# Patient Record
Sex: FEMALE | Race: ASIAN | NOT HISPANIC OR LATINO | URBAN - METROPOLITAN AREA
[De-identification: names, ages, dates, MRNs, and addresses within clinical notes are randomized per-mention and may not be internally consistent; named-entity substitution may affect disease eponyms.]

---

## 2017-12-18 ENCOUNTER — EMERGENCY (EMERGENCY)
Facility: HOSPITAL | Age: 22
LOS: 1 days | Discharge: HOME | End: 2017-12-18
Attending: EMERGENCY MEDICINE | Admitting: EMERGENCY MEDICINE
Payer: COMMERCIAL

## 2017-12-18 VITALS
OXYGEN SATURATION: 100 % | DIASTOLIC BLOOD PRESSURE: 59 MMHG | RESPIRATION RATE: 20 BRPM | HEART RATE: 113 BPM | SYSTOLIC BLOOD PRESSURE: 92 MMHG | TEMPERATURE: 98 F

## 2017-12-18 PROCEDURE — 99285 EMERGENCY DEPT VISIT HI MDM: CPT

## 2017-12-18 RX ORDER — SODIUM CHLORIDE 9 MG/ML
1000 INJECTION INTRAMUSCULAR; INTRAVENOUS; SUBCUTANEOUS ONCE
Qty: 0 | Refills: 0 | Status: COMPLETED | OUTPATIENT
Start: 2017-12-18 | End: 2017-12-18

## 2017-12-18 RX ORDER — ONDANSETRON 8 MG/1
4 TABLET, FILM COATED ORAL ONCE
Qty: 0 | Refills: 0 | Status: COMPLETED | OUTPATIENT
Start: 2017-12-18 | End: 2017-12-18

## 2017-12-18 RX ORDER — FAMOTIDINE 10 MG/ML
20 INJECTION INTRAVENOUS ONCE
Qty: 0 | Refills: 0 | Status: COMPLETED | OUTPATIENT
Start: 2017-12-18 | End: 2017-12-18

## 2017-12-18 RX ADMIN — ONDANSETRON 4 MILLIGRAM(S): 8 TABLET, FILM COATED ORAL at 23:58

## 2017-12-18 RX ADMIN — FAMOTIDINE 20 MILLIGRAM(S): 10 INJECTION INTRAVENOUS at 23:58

## 2017-12-18 RX ADMIN — SODIUM CHLORIDE 1000 MILLILITER(S): 9 INJECTION INTRAMUSCULAR; INTRAVENOUS; SUBCUTANEOUS at 23:58

## 2017-12-18 NOTE — ED PROVIDER NOTE - MUSCULOSKELETAL, MLM
Spine appears normal, range of motion is not limited, no muscle or joint tenderness. No CVA tenderness

## 2017-12-18 NOTE — ED PROVIDER NOTE - PROGRESS NOTE DETAILS
Joanne: labs and US wnl. Patient reports improvement in symptoms and tolerating PO. Patient isntructed to return to ED if persistent vomiting/fever or pain that localizes to RLQ

## 2017-12-18 NOTE — ED PROVIDER NOTE - MEDICAL DECISION MAKING DETAILS
22 F with diffuse abd pain worse in epigastric area. VS with tachycardia, afebrile. Exam with epigastric tenderness, negative ethan. With vomiting and diarrhea and afebrile likely gastroenteritis. Will check basic labs with lipase, hydration, symptom management, and CXR/ekg.

## 2017-12-18 NOTE — ED ADULT NURSE NOTE - OBJECTIVE STATEMENT
22 year old female alert and oriented x 4 came to the ED c/o abdominal pain radiating to the back that began about 4 hours prior to arrival.  Patient said pain began suddenly and is in the epigastric region.  Patient said pain began to radiate to the chest as well.  Patient denies; shortness of breath, fevers, pain or burning with urination.   Patient has nausea.  Patient has equal and symmetrical chest rise with unlabored breathing.  Patient placed on CM in SR and EKG done at bedside.  Safety ensured.

## 2017-12-18 NOTE — ED PROVIDER NOTE - OBJECTIVE STATEMENT
22 F no pmh, p/w abdominal pain radiating to back. Patient sys she had sudden onset epigastric pain that started about 4 hours ago, with 3 episodes NBNB emesis. Now c/o pain radiating to back and chest. +chills. No fever. Felt lightheaded but no syncope. No dysuria/frequency. 22 F no pmh, p/w abdominal pain radiating to back. Patient sys she had sudden onset epigastric pain that started about 4 hours ago, with 3 episodes NBNB emesis. Now c/o pain radiating to back and chest. +chills. No fever. Felt lightheaded but no syncope. No dysuria/frequency. LMP 2 weeks ago. No pleuritic pain. No recent travel. No SOB/cough.

## 2017-12-18 NOTE — ED PROVIDER NOTE - ATTENDING CONTRIBUTION TO CARE
Remedios Viera MD  22 F with diffuse abd pain worse in epigastric area, radiating to the back, vomiting NBNB; on exam VS with tachycardia, afebrile. Exam with epigastric tenderness, negative Spence. With vomiting and diarrhea and afebrile likely gastroenteritis. Will check basic labs with lipase, hydration, symptom management, and CXR/ekg.

## 2017-12-18 NOTE — ED PROVIDER NOTE - CONSTITUTIONAL, MLM
normal... Tearful, well nourished, awake, alert, oriented to person, place, time/situation and in no apparent distress.

## 2017-12-19 VITALS — HEART RATE: 110 BPM | TEMPERATURE: 99 F

## 2017-12-19 LAB
ALBUMIN SERPL ELPH-MCNC: 4.2 G/DL — SIGNIFICANT CHANGE UP (ref 3.3–5)
ALP SERPL-CCNC: 58 U/L — SIGNIFICANT CHANGE UP (ref 40–120)
ALT FLD-CCNC: 11 U/L RC — SIGNIFICANT CHANGE UP (ref 10–45)
ANION GAP SERPL CALC-SCNC: 16 MMOL/L — SIGNIFICANT CHANGE UP (ref 5–17)
AST SERPL-CCNC: 15 U/L — SIGNIFICANT CHANGE UP (ref 10–40)
BILIRUB SERPL-MCNC: 0.4 MG/DL — SIGNIFICANT CHANGE UP (ref 0.2–1.2)
BUN SERPL-MCNC: 19 MG/DL — SIGNIFICANT CHANGE UP (ref 7–23)
CALCIUM SERPL-MCNC: 9.3 MG/DL — SIGNIFICANT CHANGE UP (ref 8.4–10.5)
CHLORIDE SERPL-SCNC: 101 MMOL/L — SIGNIFICANT CHANGE UP (ref 96–108)
CO2 SERPL-SCNC: 23 MMOL/L — SIGNIFICANT CHANGE UP (ref 22–31)
CREAT SERPL-MCNC: 0.93 MG/DL — SIGNIFICANT CHANGE UP (ref 0.5–1.3)
GAS PNL BLDV: SIGNIFICANT CHANGE UP
GLUCOSE SERPL-MCNC: 125 MG/DL — HIGH (ref 70–99)
HCT VFR BLD CALC: 40.9 % — SIGNIFICANT CHANGE UP (ref 34.5–45)
HGB BLD-MCNC: 14.1 G/DL — SIGNIFICANT CHANGE UP (ref 11.5–15.5)
LIDOCAIN IGE QN: 20 U/L — SIGNIFICANT CHANGE UP (ref 7–60)
MCHC RBC-ENTMCNC: 30.4 PG — SIGNIFICANT CHANGE UP (ref 27–34)
MCHC RBC-ENTMCNC: 34.5 GM/DL — SIGNIFICANT CHANGE UP (ref 32–36)
MCV RBC AUTO: 88.1 FL — SIGNIFICANT CHANGE UP (ref 80–100)
PLATELET # BLD AUTO: 204 K/UL — SIGNIFICANT CHANGE UP (ref 150–400)
POTASSIUM SERPL-MCNC: 4.1 MMOL/L — SIGNIFICANT CHANGE UP (ref 3.5–5.3)
POTASSIUM SERPL-SCNC: 4.1 MMOL/L — SIGNIFICANT CHANGE UP (ref 3.5–5.3)
PROT SERPL-MCNC: 8.3 G/DL — SIGNIFICANT CHANGE UP (ref 6–8.3)
RBC # BLD: 4.64 M/UL — SIGNIFICANT CHANGE UP (ref 3.8–5.2)
RBC # FLD: 11.4 % — SIGNIFICANT CHANGE UP (ref 10.3–14.5)
SODIUM SERPL-SCNC: 140 MMOL/L — SIGNIFICANT CHANGE UP (ref 135–145)
WBC # BLD: 12.3 K/UL — HIGH (ref 3.8–10.5)
WBC # FLD AUTO: 12.3 K/UL — HIGH (ref 3.8–10.5)

## 2017-12-19 PROCEDURE — 99284 EMERGENCY DEPT VISIT MOD MDM: CPT | Mod: 25

## 2017-12-19 PROCEDURE — 71046 X-RAY EXAM CHEST 2 VIEWS: CPT

## 2017-12-19 PROCEDURE — 84132 ASSAY OF SERUM POTASSIUM: CPT

## 2017-12-19 PROCEDURE — 82330 ASSAY OF CALCIUM: CPT

## 2017-12-19 PROCEDURE — 96375 TX/PRO/DX INJ NEW DRUG ADDON: CPT

## 2017-12-19 PROCEDURE — 85027 COMPLETE CBC AUTOMATED: CPT

## 2017-12-19 PROCEDURE — 82803 BLOOD GASES ANY COMBINATION: CPT

## 2017-12-19 PROCEDURE — 96374 THER/PROPH/DIAG INJ IV PUSH: CPT

## 2017-12-19 PROCEDURE — 82435 ASSAY OF BLOOD CHLORIDE: CPT

## 2017-12-19 PROCEDURE — 80053 COMPREHEN METABOLIC PANEL: CPT

## 2017-12-19 PROCEDURE — 71020: CPT | Mod: 26

## 2017-12-19 PROCEDURE — 83690 ASSAY OF LIPASE: CPT

## 2017-12-19 PROCEDURE — 83605 ASSAY OF LACTIC ACID: CPT

## 2017-12-19 PROCEDURE — 82947 ASSAY GLUCOSE BLOOD QUANT: CPT

## 2017-12-19 PROCEDURE — 85014 HEMATOCRIT: CPT

## 2017-12-19 PROCEDURE — 76705 ECHO EXAM OF ABDOMEN: CPT | Mod: 26,RT

## 2017-12-19 PROCEDURE — 76705 ECHO EXAM OF ABDOMEN: CPT

## 2017-12-19 PROCEDURE — 84295 ASSAY OF SERUM SODIUM: CPT

## 2017-12-19 RX ORDER — ACETAMINOPHEN 500 MG
1000 TABLET ORAL ONCE
Qty: 0 | Refills: 0 | Status: COMPLETED | OUTPATIENT
Start: 2017-12-19 | End: 2017-12-19

## 2017-12-19 RX ORDER — ONDANSETRON 8 MG/1
1 TABLET, FILM COATED ORAL
Qty: 6 | Refills: 0 | OUTPATIENT
Start: 2017-12-19 | End: 2017-12-20

## 2017-12-19 RX ADMIN — Medication 400 MILLIGRAM(S): at 02:30

## 2017-12-19 RX ADMIN — Medication 30 MILLILITER(S): at 00:21

## 2018-08-09 ENCOUNTER — EMERGENCY (EMERGENCY)
Facility: HOSPITAL | Age: 23
LOS: 1 days | Discharge: HOME | End: 2018-08-09
Attending: EMERGENCY MEDICINE
Payer: COMMERCIAL

## 2018-08-09 VITALS
TEMPERATURE: 98 F | SYSTOLIC BLOOD PRESSURE: 114 MMHG | RESPIRATION RATE: 16 BRPM | WEIGHT: 130.07 LBS | OXYGEN SATURATION: 97 % | DIASTOLIC BLOOD PRESSURE: 77 MMHG | HEIGHT: 62 IN | HEART RATE: 83 BPM

## 2018-08-09 VITALS
TEMPERATURE: 98 F | DIASTOLIC BLOOD PRESSURE: 65 MMHG | HEART RATE: 68 BPM | RESPIRATION RATE: 18 BRPM | SYSTOLIC BLOOD PRESSURE: 101 MMHG | OXYGEN SATURATION: 100 %

## 2018-08-09 PROCEDURE — 93005 ELECTROCARDIOGRAM TRACING: CPT

## 2018-08-09 PROCEDURE — 93010 ELECTROCARDIOGRAM REPORT: CPT | Mod: NC

## 2018-08-09 PROCEDURE — 82962 GLUCOSE BLOOD TEST: CPT

## 2018-08-09 PROCEDURE — 99284 EMERGENCY DEPT VISIT MOD MDM: CPT | Mod: 25

## 2018-08-09 PROCEDURE — 96360 HYDRATION IV INFUSION INIT: CPT

## 2018-08-09 PROCEDURE — 99283 EMERGENCY DEPT VISIT LOW MDM: CPT | Mod: 25

## 2018-08-09 RX ORDER — ACETAMINOPHEN 500 MG
975 TABLET ORAL ONCE
Qty: 0 | Refills: 0 | Status: COMPLETED | OUTPATIENT
Start: 2018-08-09 | End: 2018-08-09

## 2018-08-09 RX ORDER — SODIUM CHLORIDE 9 MG/ML
1000 INJECTION INTRAMUSCULAR; INTRAVENOUS; SUBCUTANEOUS ONCE
Qty: 0 | Refills: 0 | Status: COMPLETED | OUTPATIENT
Start: 2018-08-09 | End: 2018-08-09

## 2018-08-09 RX ADMIN — SODIUM CHLORIDE 1000 MILLILITER(S): 9 INJECTION INTRAMUSCULAR; INTRAVENOUS; SUBCUTANEOUS at 13:08

## 2018-08-09 RX ADMIN — Medication 975 MILLIGRAM(S): at 14:01

## 2018-08-09 RX ADMIN — Medication 975 MILLIGRAM(S): at 13:09

## 2018-08-09 RX ADMIN — SODIUM CHLORIDE 1000 MILLILITER(S): 9 INJECTION INTRAMUSCULAR; INTRAVENOUS; SUBCUTANEOUS at 14:01

## 2018-08-09 NOTE — ED PROVIDER NOTE - MEDICAL DECISION MAKING DETAILS
MD Esme,Attending: pt seen. agree with above HPI/ROS/PE. no high risk features such as fever/neck sxs/neuro findings. NO CNS imaging indicated at this time. For EKG to r/o dysrhythmia/FSBG r/o hypoglycemia; HEG r/o pregnancy. If normal: DC for OP eval and return for progressive sxs.

## 2018-08-09 NOTE — ED PROVIDER NOTE - PROGRESS NOTE DETAILS
Patient feeling better after IVF and tylenol. Instructed to follow up with her pmd. Return precautions for worsening headache, neck stiffness, fever.

## 2018-08-09 NOTE — ED ADULT NURSE NOTE - NSIMPLEMENTINTERV_GEN_ALL_ED
Implemented All Universal Safety Interventions:  [place] to call system. Call bell, personal items and telephone within reach. Instruct patient to call for assistance. Room bathroom lighting operational. Non-slip footwear when patient is off stretcher. Physically safe environment: no spills, clutter or unnecessary equipment. Stretcher in lowest position, wheels locked, appropriate side rails in place.

## 2018-08-09 NOTE — ED ADULT NURSE NOTE - OBJECTIVE STATEMENT
23 y.o F A&Ox3 with no pertinent PMH presents to the ED c/o HA and dizziness since Friday. Pt. reports she developed a HA to the back of her head on Friday and felt dizzy so she visited the wellness center. Pt. reports her BP was low - 80s/60s and was sent home. Pt. states she had "Chest pain" Friday which resolved spontaneously. Pt. reports symptoms continued throughout night and took an Advil for her HA. Reports she began having chills and took her temperature and it was 102.5. Pt. reports fever resolved spontaneously and has not had a temperature since. Pt. also reports she has been felling SOB during activity while walking up stairs and has not experienced this before. States she went back to the wellness center yesterday again for same symptoms. Pt. states "ears feel clogged with HA." Denies ABD pain. Last BM was yesterday and states "it was hard green lyric." Pt. reports she has not been staying hydrated, drinks 2 water bottles a day. Pt. denies taking any medications today. Reports she currently has a HA and feels dizzy while lying down. PERRL- 2 mm bilaterally. Pos. strength and sensory perception to all extremities. Denies numbness/tingling. LMP this Monday. Safety and comfort provided. Family at bedside.

## 2018-08-09 NOTE — ED ADULT TRIAGE NOTE - CHIEF COMPLAINT QUOTE
Evaluated [facility] x 1 week ago at job s/p episode of dizziness and diaphoresis, had BP 80/60 of and 103F on Friday. Pt endorsing continued symptoms of dizziness and headache.

## 2018-08-09 NOTE — ED PROVIDER NOTE - OBJECTIVE STATEMENT
23 F no significant pmh, p/w dizziness and headache. Headache is in occipital area, started gradually 5 days ago, intermittent, not related to exertion or position. Had fever 5 days ago, but has been afebrile since then. No neck pain or stiffness. Taking motrin for headache with slight relief. No n/v. No vision change or numbness/weakness. LMP 3 days ago.

## 2019-05-25 ENCOUNTER — EMERGENCY (EMERGENCY)
Facility: HOSPITAL | Age: 24
LOS: 1 days | Discharge: HOME | End: 2019-05-25
Attending: EMERGENCY MEDICINE
Payer: COMMERCIAL

## 2019-05-25 VITALS
RESPIRATION RATE: 20 BRPM | WEIGHT: 134.92 LBS | TEMPERATURE: 98 F | DIASTOLIC BLOOD PRESSURE: 77 MMHG | SYSTOLIC BLOOD PRESSURE: 110 MMHG | HEART RATE: 88 BPM | HEIGHT: 63 IN | OXYGEN SATURATION: 99 %

## 2019-05-25 LAB
ALBUMIN SERPL ELPH-MCNC: 4.1 G/DL — SIGNIFICANT CHANGE UP (ref 3.3–5)
ALP SERPL-CCNC: 65 U/L — SIGNIFICANT CHANGE UP (ref 40–120)
ALT FLD-CCNC: 12 U/L — SIGNIFICANT CHANGE UP (ref 10–45)
ANION GAP SERPL CALC-SCNC: 11 MMOL/L — SIGNIFICANT CHANGE UP (ref 5–17)
AST SERPL-CCNC: 17 U/L — SIGNIFICANT CHANGE UP (ref 10–40)
BASOPHILS # BLD AUTO: 0 K/UL — SIGNIFICANT CHANGE UP (ref 0–0.2)
BASOPHILS NFR BLD AUTO: 0.4 % — SIGNIFICANT CHANGE UP (ref 0–2)
BILIRUB SERPL-MCNC: 0.2 MG/DL — SIGNIFICANT CHANGE UP (ref 0.2–1.2)
BUN SERPL-MCNC: 13 MG/DL — SIGNIFICANT CHANGE UP (ref 7–23)
CALCIUM SERPL-MCNC: 9.5 MG/DL — SIGNIFICANT CHANGE UP (ref 8.4–10.5)
CHLORIDE SERPL-SCNC: 104 MMOL/L — SIGNIFICANT CHANGE UP (ref 96–108)
CO2 SERPL-SCNC: 24 MMOL/L — SIGNIFICANT CHANGE UP (ref 22–31)
CREAT SERPL-MCNC: 0.78 MG/DL — SIGNIFICANT CHANGE UP (ref 0.5–1.3)
EOSINOPHIL # BLD AUTO: 0.1 K/UL — SIGNIFICANT CHANGE UP (ref 0–0.5)
EOSINOPHIL NFR BLD AUTO: 1.5 % — SIGNIFICANT CHANGE UP (ref 0–6)
GLUCOSE SERPL-MCNC: 84 MG/DL — SIGNIFICANT CHANGE UP (ref 70–99)
HCT VFR BLD CALC: 39.6 % — SIGNIFICANT CHANGE UP (ref 34.5–45)
HGB BLD-MCNC: 13.5 G/DL — SIGNIFICANT CHANGE UP (ref 11.5–15.5)
LYMPHOCYTES # BLD AUTO: 3.1 K/UL — SIGNIFICANT CHANGE UP (ref 1–3.3)
LYMPHOCYTES # BLD AUTO: 31.4 % — SIGNIFICANT CHANGE UP (ref 13–44)
MCHC RBC-ENTMCNC: 29.8 PG — SIGNIFICANT CHANGE UP (ref 27–34)
MCHC RBC-ENTMCNC: 34 GM/DL — SIGNIFICANT CHANGE UP (ref 32–36)
MCV RBC AUTO: 87.4 FL — SIGNIFICANT CHANGE UP (ref 80–100)
MONOCYTES # BLD AUTO: 0.9 K/UL — SIGNIFICANT CHANGE UP (ref 0–0.9)
MONOCYTES NFR BLD AUTO: 8.9 % — SIGNIFICANT CHANGE UP (ref 2–14)
NEUTROPHILS # BLD AUTO: 5.8 K/UL — SIGNIFICANT CHANGE UP (ref 1.8–7.4)
NEUTROPHILS NFR BLD AUTO: 57.9 % — SIGNIFICANT CHANGE UP (ref 43–77)
PLATELET # BLD AUTO: 254 K/UL — SIGNIFICANT CHANGE UP (ref 150–400)
POTASSIUM SERPL-MCNC: 4 MMOL/L — SIGNIFICANT CHANGE UP (ref 3.5–5.3)
POTASSIUM SERPL-SCNC: 4 MMOL/L — SIGNIFICANT CHANGE UP (ref 3.5–5.3)
PROT SERPL-MCNC: 7.9 G/DL — SIGNIFICANT CHANGE UP (ref 6–8.3)
RBC # BLD: 4.53 M/UL — SIGNIFICANT CHANGE UP (ref 3.8–5.2)
RBC # FLD: 11.3 % — SIGNIFICANT CHANGE UP (ref 10.3–14.5)
SODIUM SERPL-SCNC: 139 MMOL/L — SIGNIFICANT CHANGE UP (ref 135–145)
WBC # BLD: 10 K/UL — SIGNIFICANT CHANGE UP (ref 3.8–10.5)
WBC # FLD AUTO: 10 K/UL — SIGNIFICANT CHANGE UP (ref 3.8–10.5)

## 2019-05-25 PROCEDURE — 70491 CT SOFT TISSUE NECK W/DYE: CPT | Mod: 26

## 2019-05-25 PROCEDURE — 99284 EMERGENCY DEPT VISIT MOD MDM: CPT

## 2019-05-25 NOTE — ED PROVIDER NOTE - CLINICAL SUMMARY MEDICAL DECISION MAKING FREE TEXT BOX
24 year old F with right sided neck pain and dysphagia s/p wisdom tooth extraction 2 weeks ago. Afebrile here in ED. Oropharynx with no obvious signs of infection. No significant swelling of neck, likely pain secondary to extraction but will evaluate for possible abscess. 24 year old F with right sided neck pain and dysphagia s/p wisdom tooth extraction 2 weeks ago. Afebrile here in ED. Oropharynx with no obvious signs of infection. No significant swelling of neck, likely lymphadenopathy. Will evaluate for possible deep space abscess due to recent procedure and hx of abscess. Plan: CT neck, labs

## 2019-05-25 NOTE — ED PROVIDER NOTE - OBJECTIVE STATEMENT
24 year old F with recent hx of wisdom tooth removal 2 weeks ago presents to ED c/o of constant right ear and right sided sharp neck pain and painful swallowing x1 week. +cold sweats. Pt began to feel neck pain on 5/20 and the following day developed an earache. Neck pain worsened when laying on side and notes some difficulty breathing when laying down. Decreased PO intake secondary to pain. States she "feels as if something stuck in throat" but does not feel bumps when palpating neck. Denies drainage or foul smell from site. Denies fever, rash, and  abd pain. Saw her dentist who advised to take ibuprofen, but Sx continued. Has been taking Naproxen with no relief. Hx of throat abscess, no surgical removal, given steroids.

## 2019-05-25 NOTE — ED PROVIDER NOTE - NSFOLLOWUPINSTRUCTIONS_ED_ALL_ED_FT
- Follow up with ENT as provided  - Take Augmentin as prescribed  - Take Naproxen and tylenol for pain  - Return to ED for new or worsening symptoms

## 2019-05-25 NOTE — ED PROVIDER NOTE - PROGRESS NOTE DETAILS
Attending Grabiel Jimenez DO: CT with tonsilitis without abscess. Pt in no resp distress, no stridor, no hoarse or muffled voice. Will treat with decadron and abx and dc with ENT follow up.

## 2019-05-25 NOTE — ED PROVIDER NOTE - ATTENDING CONTRIBUTION TO CARE
I performed a history and physical exam of the patient and discussed their management with the resident. I reviewed the resident's note and agree with the documented findings and plan of care, except if noted below. My medical decision making and observations can be found be found below.    25 yo female presents with right sided anterior neck pain and swelling for past 2 weeks since wisdom tooth extraction. States painful when swallowing. No fevers. On exam, well appearing, no difficulty breathing, no stridor, +tender right sided anterior cervical lymphadenopathy, uvula midline, no peritonsillar swelling. Likely pain from recent dental work with reactive lymphadenopathy as cause of neck pain, however given recent procedure will obtain ct scan to eval for deep space collection/infection.

## 2019-05-25 NOTE — ED ADULT NURSE NOTE - OBJECTIVE STATEMENT
pt had the right wisdom tooth removed 2 weeks ago  she developed pain on the right front of her neck involving her right ear this past week.  she is here for continued pain that the DDS refers to as referred pain

## 2019-05-25 NOTE — ED PROVIDER NOTE - NSFOLLOWUPCLINICS_GEN_ALL_ED_FT
[facility]  ENT  [address]  [place] [address]  Phone: [phone]  Fax:   Follow Up Time:

## 2019-05-25 NOTE — ED ADULT TRIAGE NOTE - CHIEF COMPLAINT QUOTE
x 2wks pulled wisdom tooth and 1wk w/ ear neck and diff swallowing per pt   seen by dentist called referred pain

## 2019-05-25 NOTE — ED PROVIDER NOTE - TOOTH FINDINGS
no obvious abscess or unilateral swelling noted. There is an empty socket in wisdom teeth on right side with no purulent drainage noted. No tenderness to palpitation with teeth, but there is mild tenderness in empty socket.

## 2019-05-26 VITALS — WEIGHT: 39.46 LBS

## 2019-05-26 PROCEDURE — 80053 COMPREHEN METABOLIC PANEL: CPT

## 2019-05-26 PROCEDURE — 85027 COMPLETE CBC AUTOMATED: CPT

## 2019-05-26 PROCEDURE — 99284 EMERGENCY DEPT VISIT MOD MDM: CPT | Mod: 25

## 2019-05-26 PROCEDURE — 70491 CT SOFT TISSUE NECK W/DYE: CPT

## 2019-05-26 RX ORDER — DEXAMETHASONE 0.5 MG/5ML
10 ELIXIR ORAL ONCE
Refills: 0 | Status: COMPLETED | OUTPATIENT
Start: 2019-05-26 | End: 2019-05-26

## 2019-05-26 RX ORDER — ACETAMINOPHEN 500 MG
650 TABLET ORAL ONCE
Refills: 0 | Status: COMPLETED | OUTPATIENT
Start: 2019-05-26 | End: 2019-05-26

## 2019-05-26 RX ADMIN — Medication 10 MILLIGRAM(S): at 00:43

## 2019-05-26 RX ADMIN — Medication 650 MILLIGRAM(S): at 00:50

## 2019-05-26 RX ADMIN — Medication 1 TABLET(S): at 00:43

## 2019-05-28 PROBLEM — Z00.00 ENCOUNTER FOR PREVENTIVE HEALTH EXAMINATION: Status: ACTIVE | Noted: 2019-05-28

## 2019-06-03 ENCOUNTER — APPOINTMENT (OUTPATIENT)
Dept: OTOLARYNGOLOGY | Facility: CLINIC | Age: 24
End: 2019-06-03
Payer: COMMERCIAL

## 2019-06-03 VITALS
HEART RATE: 72 BPM | HEIGHT: 63 IN | DIASTOLIC BLOOD PRESSURE: 62 MMHG | TEMPERATURE: 98.4 F | BODY MASS INDEX: 23.92 KG/M2 | OXYGEN SATURATION: 99 % | SYSTOLIC BLOOD PRESSURE: 102 MMHG | WEIGHT: 135 LBS

## 2019-06-03 DIAGNOSIS — R59.0 LOCALIZED ENLARGED LYMPH NODES: ICD-10-CM

## 2019-06-03 DIAGNOSIS — Z78.9 OTHER SPECIFIED HEALTH STATUS: ICD-10-CM

## 2019-06-03 DIAGNOSIS — Z82.49 FAMILY HISTORY OF ISCHEMIC HEART DISEASE AND OTHER DISEASES OF THE CIRCULATORY SYSTEM: ICD-10-CM

## 2019-06-03 DIAGNOSIS — H92.01 OTALGIA, RIGHT EAR: ICD-10-CM

## 2019-06-03 DIAGNOSIS — Z83.3 FAMILY HISTORY OF DIABETES MELLITUS: ICD-10-CM

## 2019-06-03 PROCEDURE — 99243 OFF/OP CNSLTJ NEW/EST LOW 30: CPT

## 2019-06-03 RX ORDER — AMOXICILLIN AND CLAVULANATE POTASSIUM 875; 125 MG/1; 1/1
875-125 TABLET, FILM COATED ORAL
Refills: 0 | Status: ACTIVE | COMMUNITY

## 2019-06-03 RX ORDER — NORETHINDRONE ACETATE AND ETHINYL ESTRADIOL AND FERROUS FUMARATE 1MG-20(21)
1-20 KIT ORAL
Refills: 0 | Status: ACTIVE | COMMUNITY

## 2019-06-03 NOTE — DATA REVIEWED
[de-identified] : CT neck - Images reviewed.  No PTA or tonsillar swelling seen.  No neck abscess.

## 2019-06-03 NOTE — REVIEW OF SYSTEMS
[Ear Pain] : ear pain [Ear Itch] : ear itch [Throat Clearing] : throat clearing [Throat Itching] : throat itching [Throat Dryness] : throat dryness [Throat Pain] : throat pain [Swelling Face] : face swelling [Swelling Neck] : swelling neck [Swollen Glands] : swollen glands [Negative] : Endocrine [Hearing Loss] : no hearing loss [Dizziness] : no dizziness [Vertigo] : no vertigo [Recurrent Ear Infections] : no recurrent ear infections [Lightheadedness] : no lightheadedness [Ear Noises] : no ear noises [Ear Drainage] : no ear drainage [Hoarseness] : no hoarseness [Easy Bleeding] : no tendency for easy bleeding [Easy Bruising] : no tendency for easy bruising [Swollen Glands In The Neck] : no swollen glands in the neck [FreeTextEntry7] : Difficulty swallowing [de-identified] : Headache [de-identified] : Feel cooler than others

## 2019-06-03 NOTE — CONSULT LETTER
[Dear  ___] : Dear  [unfilled], [Please see my note below.] : Please see my note below. [Consult Letter:] : I had the pleasure of evaluating your patient, [unfilled]. [Consult Closing:] : Thank you very much for allowing me to participate in the care of this patient.  If you have any questions, please do not hesitate to contact me. [Sincerely,] : Sincerely, [FreeTextEntry3] : Alexx Barrios MD, FACS\par Clinical \par Dept. of Otolaryngology\par [facility]\par  [FreeTextEntry2] : Mic Rodriges MD

## 2019-06-03 NOTE — PHYSICAL EXAM
[de-identified] : Mildly tender right MARIAM node. [Midline] : trachea located in midline position [de-identified] : 2+ [Normal] : no rashes

## 2019-06-03 NOTE — HISTORY OF PRESENT ILLNESS
[de-identified] : 23 y/o F with a h/o  recent right lower wisdom tooth removal 5/10.  She was treated with Augmentin afterwards.  After the procedure the pt began having right otalgia and neck pain.  She was seen in the ER and had a CT done.  She was told to see an ENT for possible tonsil infection.  The discomfort has been slowly inproving.

## 2019-06-03 NOTE — ASSESSMENT
[FreeTextEntry1] : Pt with mild right MARIAM node tenderness with referred otalgia after wisdom tooth extraction.  Pt with no evidence of active infection today.  Pt to take Advil prn. If she does not continue to improve she will return for reassessment.

## 2020-11-24 NOTE — ED PROVIDER NOTE - NSCAREINITIATED _GEN_ER
Subjective:      Lupis Colunga is a 61 y.o. female who presents for 6 month follow up visit for Breast Cancer.        HPI    Patient seen today in follow-up for T1bN0 ER/MD +, ki-67 < 10%, grade 1 papillary breast cancer.  She presents unaccompanied for today's visit.     Patient underwent a lumpectomy December 2016 followed by radiation therapy and aromatase inhibitor.  Patient was diagnosed with grade 1 papillary carcinoma.  Patient is currently on anastrozole and tolerating it well.    Patient has been seen every 6 months for continued follow-up.  Clinically she is doing very well and stable with no complaints.  Her follow-up visit was delayed somewhat due to Covid-19 pandemic.    Patient did have a mammogram completed on 6/2/2020 which noted no interval change and no new suspicious findings identified.  I did review the mammogram results with the patient today.    Allergies   Allergen Reactions   • Penicillins Anaphylaxis     Tolerates cephalosporins; blisters     Current Outpatient Medications on File Prior to Visit   Medication Sig Dispense Refill   • anastrozole (ARIMIDEX) 1 MG Tab TAKE 1 TABLET BY MOUTH EVERY DAY 90 Tab 1   • atorvastatin (LIPITOR) 40 MG Tab Take 40 mg by mouth.     • levothyroxine (SYNTHROID) 75 MCG Tab Take 75 mcg by mouth Every morning on an empty stomach.     • aspirin EC (ECOTRIN) 81 MG Tablet Delayed Response Take 81 mg by mouth.     • enalapril (VASOTEC) 10 MG Tab Take 10 mg by mouth every day.     • simvastatin (ZOCOR) 20 MG Tab Take 20 mg by mouth every evening.     • enalapril (VASOTEC) 10 MG Tab Take 10 mg by mouth.       No current facility-administered medications on file prior to visit.          Review of Systems   Constitutional: Negative for chills, diaphoresis, fever, malaise/fatigue and weight loss.   HENT: Negative for congestion and sore throat.    Respiratory: Negative for cough, shortness of breath and wheezing.    Cardiovascular: Negative for chest pain and  "palpitations.   Gastrointestinal: Negative for constipation, diarrhea, nausea and vomiting.   Genitourinary: Negative for dysuria and hematuria.   Musculoskeletal: Negative for myalgias.   Skin: Negative for itching and rash.   Neurological: Negative for dizziness, tingling and headaches.   Psychiatric/Behavioral: Negative for depression. The patient is not nervous/anxious and does not have insomnia.           Objective:     /82   Pulse (!) 120   Temp 36.8 °C (98.3 °F) (Temporal)   Resp 14   Ht 1.499 m (4' 11.02\")   Wt 74 kg (163 lb 2.3 oz)   SpO2 96%   BMI 32.93 kg/m²      Physical Exam  Vitals signs reviewed.   Constitutional:       General: She is not in acute distress.     Appearance: Normal appearance. She is normal weight. She is not diaphoretic.   HENT:      Head: Normocephalic and atraumatic.   Cardiovascular:      Rate and Rhythm: Normal rate and regular rhythm.      Pulses: Normal pulses.      Heart sounds: Normal heart sounds. No murmur. No friction rub. No gallop.    Pulmonary:      Effort: Pulmonary effort is normal. No respiratory distress.      Breath sounds: No wheezing.      Comments: Slightly diminished throughout  Chest:      Breasts:         Right: No swelling, bleeding, inverted nipple, mass, nipple discharge, skin change or tenderness.         Left: No swelling, bleeding, inverted nipple, mass, nipple discharge, skin change or tenderness.   Abdominal:      General: Bowel sounds are normal. There is no distension.      Palpations: Abdomen is soft.      Tenderness: There is no abdominal tenderness.   Musculoskeletal: Normal range of motion.         General: No swelling or tenderness.   Lymphadenopathy:      Cervical: No cervical adenopathy.      Upper Body:      Right upper body: No supraclavicular or axillary adenopathy.      Left upper body: No supraclavicular or axillary adenopathy.   Skin:     General: Skin is warm and dry.   Neurological:      Mental Status: She is alert and " oriented to person, place, and time.   Psychiatric:         Mood and Affect: Mood normal.         Behavior: Behavior normal.       MAMMOGRAM  06/02/2020    IMPRESSION:     No interval change and no new suspicious findings identified.     Annual follow-up recommended, unless otherwise clinically indicated.     These results were given to the patient at the time of visit.     R2- Category 2:  Benign Finding(s)         Assessment/Plan:        1. Malignant neoplasm of central portion of left breast in female, estrogen receptor positive (HCC)  MA-DIAGNOSTIC MAMMO BILAT W/TOMOSYNTHESIS W/CAD    DS-BONE DENSITY STUDY (DEXA)   2. Aromatase inhibitor use  DS-BONE DENSITY STUDY (DEXA)   3. Osteopenia of spine  DS-BONE DENSITY STUDY (DEXA)   4. At high risk for osteoporosis  DS-BONE DENSITY STUDY (DEXA)     Plan  1. Patient with T1bN0 ER/VT +, ki-67 < 10%, grade 1 papillary breast cancer, currently on anastrozole.  She was started on anastrozole in May 2017, plan to complete 5 years of treatment, due to complete in May 2022.  Patient is tolerating anastrozole well.    Patient is due for follow-up mammogram in June 2021 which is been ordered.    2.  Patient is at a higher risk for osteoporosis.  She does have osteopenia of the spine and is currently taking vitamin D and calcium supplements.  She is due for a follow-up bone density in May 2021 which has been ordered as well.  She is to continue on vitamin D and calcium supplements.    3.  We will schedule mammogram and bone density for same timing and have patient follow-up with me in the clinic to review those results for continued monitoring and management of her breast cancer, or sooner if needed.     Grabiel Jimenez(Attending)

## 2021-08-04 NOTE — ED ADULT NURSE NOTE - NS ED NURSE RECORD ANOTHER VITAL SIGN
Reports +fm, denies any bleeding. Pfizer Covid vaccine #1 7/30/21. FBS today 92.  BS & B/P log on chart, reviewed by Dr. Pemberton Handing no changes to insulin Yes